# Patient Record
Sex: MALE | Race: WHITE
[De-identification: names, ages, dates, MRNs, and addresses within clinical notes are randomized per-mention and may not be internally consistent; named-entity substitution may affect disease eponyms.]

---

## 2022-07-09 ENCOUNTER — HOSPITAL ENCOUNTER (EMERGENCY)
Dept: HOSPITAL 19 - COL.ER | Age: 77
Discharge: HOME | End: 2022-07-09
Attending: EMERGENCY MEDICINE
Payer: MEDICARE

## 2022-07-09 VITALS — HEART RATE: 62 BPM | DIASTOLIC BLOOD PRESSURE: 80 MMHG | SYSTOLIC BLOOD PRESSURE: 168 MMHG

## 2022-07-09 VITALS — BODY MASS INDEX: 31.18 KG/M2 | HEIGHT: 69.02 IN | WEIGHT: 210.54 LBS

## 2022-07-09 VITALS — TEMPERATURE: 98 F

## 2022-07-09 DIAGNOSIS — R33.9: Primary | ICD-10-CM

## 2022-07-12 ENCOUNTER — HOSPITAL ENCOUNTER (OUTPATIENT)
Dept: HOSPITAL 19 - SDCO | Age: 77
Discharge: HOME | End: 2022-07-12
Attending: UROLOGY
Payer: MEDICARE

## 2022-07-12 VITALS — HEART RATE: 50 BPM | SYSTOLIC BLOOD PRESSURE: 93 MMHG | DIASTOLIC BLOOD PRESSURE: 46 MMHG

## 2022-07-12 VITALS — WEIGHT: 214.51 LBS | BODY MASS INDEX: 31.77 KG/M2 | HEIGHT: 69.02 IN

## 2022-07-12 VITALS — TEMPERATURE: 97.4 F | SYSTOLIC BLOOD PRESSURE: 164 MMHG | DIASTOLIC BLOOD PRESSURE: 83 MMHG | HEART RATE: 62 BPM

## 2022-07-12 VITALS — HEART RATE: 50 BPM | SYSTOLIC BLOOD PRESSURE: 107 MMHG | DIASTOLIC BLOOD PRESSURE: 48 MMHG

## 2022-07-12 VITALS — SYSTOLIC BLOOD PRESSURE: 81 MMHG | TEMPERATURE: 97.7 F | DIASTOLIC BLOOD PRESSURE: 41 MMHG | HEART RATE: 48 BPM

## 2022-07-12 VITALS — HEART RATE: 56 BPM | SYSTOLIC BLOOD PRESSURE: 118 MMHG | DIASTOLIC BLOOD PRESSURE: 60 MMHG

## 2022-07-12 VITALS — HEART RATE: 52 BPM | SYSTOLIC BLOOD PRESSURE: 117 MMHG | DIASTOLIC BLOOD PRESSURE: 63 MMHG

## 2022-07-12 DIAGNOSIS — Z87.891: ICD-10-CM

## 2022-07-12 DIAGNOSIS — N32.0: Primary | ICD-10-CM

## 2022-07-12 PROCEDURE — C1769 GUIDE WIRE: HCPCS

## 2022-07-12 PROCEDURE — A4215 STERILE NEEDLE: HCPCS

## 2022-07-12 NOTE — NUR
PATIENT ARRIVES FROM OR TO ROOM 1.  DROWSY, AROUSES TO VERBAL STIMULI.  O2 AT
8L/FACE MASK.  CRNA AT BEDSIDE.  VELARDE CATHETER PATENT PINK URINE RETURN.
WIFE IN ROOM.

## 2022-07-12 NOTE — NUR
1413  ADMITTED AMBULATORY TO ROOM 1.  ALERT/ORIENTED.  PATIENT EXPRESSES
PRESENCE OF SIGNIFICANT DISCOMFORT SECONDARY TO URINARY URGENCY.  CONSENT
SIGNED.  VITAL SIGNS OBTAINED.  RESP CLEAR, SPONTANEOUS.  LUNG SOUNDS CLEAR.

## 2022-07-12 NOTE — NUR
250 ML OF LIGHT PINK URINE EMPTIED FROM VELARDE CATHETER BAG.  PATIENT THEN SITS
ON EDGE OF BED DRESSES SELF WITH MINIMAL ASSISTANCE FROM WIFE.

## 2022-07-12 NOTE — NUR
DISCHARGE INSTRUCTIONS REVIEWED WITH PATIENT AND WIFE.  COPY OF INSTRUCTIONS
AND EDUCATIONAL MATERIALS PROVIDED.